# Patient Record
Sex: MALE | Race: WHITE | Employment: OTHER | URBAN - METROPOLITAN AREA
[De-identification: names, ages, dates, MRNs, and addresses within clinical notes are randomized per-mention and may not be internally consistent; named-entity substitution may affect disease eponyms.]

---

## 2020-02-23 ENCOUNTER — HOSPITAL ENCOUNTER (EMERGENCY)
Age: 70
Discharge: LEFT BEFORE DISCHARGE | End: 2020-02-23
Attending: EMERGENCY MEDICINE
Payer: OTHER GOVERNMENT

## 2020-02-23 ENCOUNTER — APPOINTMENT (OUTPATIENT)
Dept: CT IMAGING | Age: 70
End: 2020-02-23
Payer: OTHER GOVERNMENT

## 2020-02-23 VITALS
TEMPERATURE: 97.7 F | DIASTOLIC BLOOD PRESSURE: 70 MMHG | HEIGHT: 72 IN | WEIGHT: 190 LBS | HEART RATE: 67 BPM | OXYGEN SATURATION: 97 % | RESPIRATION RATE: 16 BRPM | SYSTOLIC BLOOD PRESSURE: 109 MMHG | BODY MASS INDEX: 25.73 KG/M2

## 2020-02-23 LAB
CHP ED QC CHECK: YES
GLUCOSE BLD-MCNC: 82 MG/DL
METER GLUCOSE: 82 MG/DL (ref 74–99)

## 2020-02-23 PROCEDURE — 82962 GLUCOSE BLOOD TEST: CPT

## 2020-02-23 PROCEDURE — 12015 RPR F/E/E/N/L/M 7.6-12.5 CM: CPT

## 2020-02-23 PROCEDURE — 99283 EMERGENCY DEPT VISIT LOW MDM: CPT

## 2020-02-23 RX ORDER — LIDOCAINE HYDROCHLORIDE AND EPINEPHRINE 10; 10 MG/ML; UG/ML
20 INJECTION, SOLUTION INFILTRATION; PERINEURAL ONCE
Status: DISCONTINUED | OUTPATIENT
Start: 2020-02-23 | End: 2020-02-23 | Stop reason: HOSPADM

## 2020-02-23 ASSESSMENT — ENCOUNTER SYMPTOMS
CONSTIPATION: 0
ABDOMINAL PAIN: 0
VOMITING: 0
COUGH: 0
SORE THROAT: 0
BLOOD IN STOOL: 0
EYE PAIN: 0
DIARRHEA: 0
EYE REDNESS: 0
RHINORRHEA: 0
SHORTNESS OF BREATH: 0
SINUS PRESSURE: 0
NAUSEA: 0
EYE DISCHARGE: 0
VISUAL CHANGE: 0
BACK PAIN: 0
WHEEZING: 0

## 2020-02-23 ASSESSMENT — VISUAL ACUITY: OU: 1

## 2020-02-23 NOTE — ED PROVIDER NOTES
Sensory: Sensation is intact. Motor: Motor function is intact. Coordination: Coordination is intact. Coordination normal.          Procedures                 Laceration Repair Procedure Note    Indication: Multiple Lacerations after fall    Procedure: The patient was placed in the appropriate position and anesthesia around the laceration was obtained by infiltration using 1% Lidocaine with epinephrine. The area was then irrigated with normal saline. The laceration was closed with 5-0 Prolene using interrupted sutures. A second laceration was closed with 5-0 Prolene using interrupted sutures. A third laceration was closed with 5-0 Prolene using interrupted sutures. The wound area was then dressed with a sterile dressing. Minimal debridement was preformed, flaps were aligned. No foreign body was identified. Total repaired wound length: 4 cm right forehead laceration. 2.5 cm right facial laceration. 3.5 cm right postauricular laceration. Other Items: Suture count: 7 sutures right forehead. 5 sutures right face. 8 sutures right postauricular. The patient tolerated the procedure well. Complications: None                    MDM  Number of Diagnoses or Management Options  Facial laceration, initial encounter:   Fall, initial encounter:   Laceration of forehead, initial encounter:   Laceration of right ear lobe, initial encounter:   Left against medical advice:   Diagnosis management comments: Patient arrived after a fall. He is taking aspirin. Patient was offered CT head/facial/C-spine but he refused. He understands the risks. He has capacity to make decision. He was agreeable to laceration repair. He had multiple lacerations including his right forehead, right face, and right postauricular area. His lacerations are repaired. Patient remained alert and oriented x3 while in the ED. His neurovascular exam remained unremarkable while in the ED. He is ambulated without problems.   He left

## 2020-10-09 ENCOUNTER — HOSPITAL ENCOUNTER (EMERGENCY)
Age: 70
End: 2020-10-10
Attending: EMERGENCY MEDICINE
Payer: OTHER GOVERNMENT

## 2020-10-09 VITALS — WEIGHT: 190 LBS

## 2020-10-09 PROCEDURE — 99285 EMERGENCY DEPT VISIT HI MDM: CPT

## 2020-10-09 PROCEDURE — 6810039001 HC L1 TRAUMA PRIORITY

## 2020-10-09 PROCEDURE — 99284 EMERGENCY DEPT VISIT MOD MDM: CPT

## 2020-10-09 PROCEDURE — 92950 HEART/LUNG RESUSCITATION CPR: CPT

## 2020-10-10 PROCEDURE — 99285 EMERGENCY DEPT VISIT HI MDM: CPT | Performed by: SURGERY

## 2020-10-10 PROCEDURE — 92950 HEART/LUNG RESUSCITATION CPR: CPT | Performed by: SURGERY
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
## 2020-10-10 NOTE — ED NOTES
intubated CPR in progress, c collar on     Mariam Dickey RN  10/09/20 2016       Mariam Dickey RN  10/09/20 2027
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
## 2020-10-10 NOTE — ED NOTES
CPR in progress. Pupils non reacted.        Meenakshi Ramirez RN  10/09/20 2017       Meenakshi Ramirez RN  10/09/20 2018
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
